# Patient Record
Sex: FEMALE | Race: WHITE
[De-identification: names, ages, dates, MRNs, and addresses within clinical notes are randomized per-mention and may not be internally consistent; named-entity substitution may affect disease eponyms.]

---

## 2020-07-31 ENCOUNTER — HOSPITAL ENCOUNTER (EMERGENCY)
Dept: HOSPITAL 62 - ER | Age: 55
LOS: 1 days | Discharge: ANOTHER HOSPITAL | End: 2020-08-01
Payer: SELF-PAY

## 2020-07-31 DIAGNOSIS — R53.1: ICD-10-CM

## 2020-07-31 DIAGNOSIS — A41.9: ICD-10-CM

## 2020-07-31 DIAGNOSIS — R11.0: ICD-10-CM

## 2020-07-31 DIAGNOSIS — N13.9: ICD-10-CM

## 2020-07-31 DIAGNOSIS — R50.9: Primary | ICD-10-CM

## 2020-07-31 DIAGNOSIS — N20.0: ICD-10-CM

## 2020-07-31 LAB
ABSOLUTE LYMPHOCYTES# (MANUAL): 0 10^3/UL (ref 0.5–4.7)
ABSOLUTE MONOCYTES # (MANUAL): 0.2 10^3/UL (ref 0.1–1.4)
ADD MANUAL DIFF: YES
ALBUMIN SERPL-MCNC: 3.6 G/DL (ref 3.5–5)
ALP SERPL-CCNC: 131 U/L (ref 38–126)
ANION GAP SERPL CALC-SCNC: 6 MMOL/L (ref 5–19)
APPEARANCE UR: (no result)
APTT PPP: YELLOW S
AST SERPL-CCNC: 32 U/L (ref 14–36)
BASE EXCESS BLDV CALC-SCNC: -2.1 MMOL/L
BASOPHILS NFR BLD MANUAL: 0 % (ref 0–2)
BILIRUB DIRECT SERPL-MCNC: 0 MG/DL (ref 0–0.4)
BILIRUB SERPL-MCNC: 0.4 MG/DL (ref 0.2–1.3)
BILIRUB UR QL STRIP: NEGATIVE
BUN SERPL-MCNC: 22 MG/DL (ref 7–20)
CALCIUM: 9.1 MG/DL (ref 8.4–10.2)
CHLORIDE SERPL-SCNC: 106 MMOL/L (ref 98–107)
CO2 SERPL-SCNC: 25 MMOL/L (ref 22–30)
EOSINOPHIL NFR BLD MANUAL: 0 % (ref 0–6)
ERYTHROCYTE [DISTWIDTH] IN BLOOD BY AUTOMATED COUNT: 12.7 % (ref 11.5–14)
GLUCOSE SERPL-MCNC: 130 MG/DL (ref 75–110)
GLUCOSE UR STRIP-MCNC: NEGATIVE MG/DL
HCO3 BLDV-SCNC: 22.3 MMOL/L (ref 20–32)
HCT VFR BLD CALC: 39.9 % (ref 36–47)
HGB BLD-MCNC: 13.2 G/DL (ref 12–15.5)
INR PPP: 0.98
KETONES UR STRIP-MCNC: NEGATIVE MG/DL
MCH RBC QN AUTO: 30.6 PG (ref 27–33.4)
MCHC RBC AUTO-ENTMCNC: 33 G/DL (ref 32–36)
MCV RBC AUTO: 93 FL (ref 80–97)
MONOCYTES % (MANUAL): 1 % (ref 3–13)
PCO2 BLDV: 37.3 MMHG (ref 35–63)
PH BLDV: 7.4 [PH] (ref 7.3–7.42)
PH UR STRIP: 5 [PH] (ref 5–9)
PLATELET # BLD: 156 10^3/UL (ref 150–450)
PLATELET COMMENT: ADEQUATE
POTASSIUM SERPL-SCNC: 3.6 MMOL/L (ref 3.6–5)
PROT SERPL-MCNC: 6.5 G/DL (ref 6.3–8.2)
PROT UR STRIP-MCNC: NEGATIVE MG/DL
PROTHROMBIN TIME: 13.1 SEC (ref 11.4–15.4)
RBC # BLD AUTO: 4.3 10^6/UL (ref 3.72–5.28)
RBC MORPH BLD: (no result)
SEGMENTED NEUTROPHILS % (MAN): 99 % (ref 42–78)
SP GR UR STRIP: 1.02
TOTAL CELLS COUNTED BLD: 100
UROBILINOGEN UR-MCNC: NEGATIVE MG/DL (ref ?–2)
VARIANT LYMPHS NFR BLD MANUAL: 0 % (ref 13–45)
WBC # BLD AUTO: 18.3 10^3/UL (ref 4–10.5)

## 2020-07-31 PROCEDURE — 96365 THER/PROPH/DIAG IV INF INIT: CPT

## 2020-07-31 PROCEDURE — 93005 ELECTROCARDIOGRAM TRACING: CPT

## 2020-07-31 PROCEDURE — 85025 COMPLETE CBC W/AUTO DIFF WBC: CPT

## 2020-07-31 PROCEDURE — 80053 COMPREHEN METABOLIC PANEL: CPT

## 2020-07-31 PROCEDURE — 71045 X-RAY EXAM CHEST 1 VIEW: CPT

## 2020-07-31 PROCEDURE — 87040 BLOOD CULTURE FOR BACTERIA: CPT

## 2020-07-31 PROCEDURE — 85610 PROTHROMBIN TIME: CPT

## 2020-07-31 PROCEDURE — 87088 URINE BACTERIA CULTURE: CPT

## 2020-07-31 PROCEDURE — 82803 BLOOD GASES ANY COMBINATION: CPT

## 2020-07-31 PROCEDURE — 83605 ASSAY OF LACTIC ACID: CPT

## 2020-07-31 PROCEDURE — 36415 COLL VENOUS BLD VENIPUNCTURE: CPT

## 2020-07-31 PROCEDURE — 74176 CT ABD & PELVIS W/O CONTRAST: CPT

## 2020-07-31 PROCEDURE — 81001 URINALYSIS AUTO W/SCOPE: CPT

## 2020-07-31 PROCEDURE — 87086 URINE CULTURE/COLONY COUNT: CPT

## 2020-07-31 PROCEDURE — 93010 ELECTROCARDIOGRAM REPORT: CPT

## 2020-07-31 PROCEDURE — 99291 CRITICAL CARE FIRST HOUR: CPT

## 2020-07-31 PROCEDURE — 87186 SC STD MICRODIL/AGAR DIL: CPT

## 2020-07-31 PROCEDURE — 96361 HYDRATE IV INFUSION ADD-ON: CPT

## 2020-07-31 RX ADMIN — SODIUM CHLORIDE, SODIUM LACTATE, POTASSIUM CHLORIDE, AND CALCIUM CHLORIDE PRN MLS/HR: .6; .31; .03; .02 INJECTION, SOLUTION INTRAVENOUS at 22:13

## 2020-07-31 RX ADMIN — SODIUM CHLORIDE, SODIUM LACTATE, POTASSIUM CHLORIDE, AND CALCIUM CHLORIDE PRN MLS/HR: .6; .31; .03; .02 INJECTION, SOLUTION INTRAVENOUS at 21:10

## 2020-07-31 NOTE — EKG REPORT
SEVERITY:- OTHERWISE NORMAL ECG -

SINUS RHYTHM

BORDERLINE LEFT AXIS DEVIATION

:

Confirmed by: Samantha Malave MD 31-Jul-2020 23:02:50

## 2020-07-31 NOTE — RADIOLOGY REPORT (SQ)
EXAM DESCRIPTION: 



XR CHEST 1 VIEW



COMPLETED DATE/TME:  07/31/2020 20:36



CLINICAL HISTORY: 



55 years, Female, fever source?



COMPARISON:

None.



NUMBER OF VIEWS:

One



TECHNIQUE:

Single frontal view of the chest was obtained portably



LIMITATIONS:

None.



FINDINGS:



Cardiac and mediastinal contours are normal. Lungs are clear. No

pleural effusion or pneumothorax.



IMPRESSION:



No acute disease.

 



copyright 2011 [employer]- All Rights Reserved

## 2020-07-31 NOTE — RADIOLOGY REPORT (SQ)
EXAM DESCRIPTION: 



CT ABDOMEN PELVIS WITHOUT IV CONTRAST



COMPLETED DATE/TME:  07/31/2020 20:37



CLINICAL HISTORY: 



55 years, Female, flank pn fever



COMPARISON:

None.



TECHNIQUE:

Noncontrast CT of the abdomen/pelvis was acquired. Coronal and

sagittal reformations were created.  Images stored on PACS.

 

All CT scanners at this facility use dose modulation, iterative

reconstruction, and/or weight based dosing when appropriate to

reduce radiation dose to as low as reasonably achievable (ALARA).





[facility]: Dose Right [facility]: CareDose   [facility]: Dose Right    [facility]:

Teradose 4D    [facility]: Smart Technologies



LIMITATIONS:

None.



FINDINGS:



Limited evaluation of the lower chest reveals clear lung bases.



Liver, spleen, pancreas, and both adrenal glands appear normal.

The gallbladder is absent.



A moderately sized nephrolith is noted about the interpolar

region of the right kidney, specifically measuring 1 cm in

diameter. Additional large nephrolith is noted about the lower

pole of the left kidney measuring 1.4 cm in diameter. Additional

calculi are noted about the left renal pelvis and ureteropelvic

junction, resulting in moderate left hydronephrosis. The

obstructive calculus is located about the UPJ measuring 0.9 cm in

size on image 43 of series 3. The urinary bladder is collapsed,

thus its evaluation is limited.



Uterus and both ovaries show no suspicious finding.



Multiple ventral hernias are noted about the anterior abdomen,

the largest of which contains both fat as well as a large amount

of small and large bowel. No evidence of bowel obstruction.

Additional ventral hernias located about the midline appear to

only contain fat.



Vascular structures are normal in their noncontrast

configuration. No suspicious lymphadenopathy or drainable fluid

collections are appreciated.



Bone windows show no destructive osseous lesions. Severe

multilevel facet arthropathy is noted about the lower lumbar

spine with grade 2 anterolisthesis of L5 upon S1.





IMPRESSION:



Obstructive 0.9 cm calculus located about the left UPJ with

associated moderate left hydronephrosis. Additional bilateral

nephrolithiasis, as above.



Large ventral hernia about the anterior mid abdomen containing a

large segment of small large bowel. No evidence of bowel

obstruction. Additional, albeit smaller fat-containing ventral

hernias.

 

TECHNICAL DOCUMENTATION:



Quality ID # [id number]: Final reports with documentation of one or more

dose reduction techniques (e.g., Automated exposure control,

adjustment of the mA and/or kV according to patient size, use of

iterative reconstruction technique)



copyright 2011 [employer]- All Rights Reserved

## 2020-07-31 NOTE — ER DOCUMENT REPORT
ED General





- General


Chief Complaint: Fever


Stated Complaint: WEAKNESS/FEVER


Time Seen by Provider: 07/31/20 20:33


Notes: 





55-year-old lady with a history of septic kidney stones presents with fever and 

weakness for 1 day with nausea increasing throughout the day today associate 

with decreased oral intake and prehospital fever found by EMS.  She is from 

[place] is no a primary care doctor.  No shortness of breath or COVID 

exposure.  No cough.  No abdominal pain.  Prehospital had an elevated lactic and

a fever.  Got some fluid prior to EMS arrival.





- Related Data


Allergies/Adverse Reactions: 


                                        





broccoli Adverse Reaction (Verified 07/31/20 20:54)


   


calcium Adverse Reaction (Verified 07/31/20 20:54)


   


mushroom Adverse Reaction (Verified 07/31/20 20:54)


   











Past Medical History





- General


Information source: Patient





- Social History


Smoking Status: Unknown if Ever Smoked


Family History: None





Review of Systems





- Review of Systems


Notes: 





REVIEW OF SYSTEMS





GEN: D fever and weakness and chills


ENT: Denies sore throat, nasal discharge, ear pain


EYES: Denies blurry vision, eye pain, discharge


CV: Denies chest pain, palpitations, edema


RESP: Denies cough, shortness of breath, wheezing


GI: Nausea and p.o. intolerance no diarrhea ea


MSK: Chronic back pain nothing different today


SKIN: Denies rash, skin lesions


LYMPH: Denies swollen glands/lymph nodes


NEURO: Denies headache, focal weakness or numbness, dizziness


PSYCH: Denies depression, suicidal or homicidal ideation








PHYSICAL EXAMINATION





General: No acute distress, well-nourished


Head: Atraumatic, normocephalic


ENT: Mouth normal, oropharynx moist, no exudates or tonsillar enlargement


Eyes: Conjunctiva normal, pupils equal, lids normal


Neck: No JVD, supple, no guarding


CVS: Normal rate, regular rhythm, no murmurs


Resp: No resp distress, equal and normal breath sounds bilaterally


GI: Nondistended, soft, no tenderness to palpation, no rebound or guarding


Ext: No deformities, no edema, normal range of motion in upper and lower ext


Back: No CVA or midline TTP


Skin: No rash, warm


Lymphatic: No lymphadeopathy noted


Neuro: Awake, alert.  Face symmetric.  GCS 15.





Physical Exam





- Vital signs


Vitals: 


                                        











Temp


 


 100.8 F H


 


 07/31/20 20:29














Course





- Re-evaluation


Re-evalutation: 





07/31/20 22:11


Patient presents with sepsis history of septic kidney stone, she had fever and 

fatigue since this morning which have worsened the point where now she is 

nauseous.  She has no back pain.  She has no diarrhea.  She has a large 

abdominal hernia which is been present for quite a while.  No skin changes 

injection drug use abscess or cellulitis etc.


07/31/20 22:14


Sepsis protocol initiated IV fluid ordered.  Ordered Rocephin as well.


Patient responded to fluids with decreased heart rate and feeling slightly 

better.  White count is grossly elevated.  Urinalysis pending, quality 

insufficient however on CT scan at about 10:10 PM I noted that she has a 

obstructing left UPJ stone which is almost a centimeter, causing hydronephrosis 

and is likely the cause of her sepsis


[facility] does not have urology, and I have made a call to [facility] at 10:10 PM for transfer.


08/01/20 00:13


Urine is infected.  [place] back from urology Dr. Solis advised.  He asked for 

the hospitalist admit the patient.  I expressed the urgency of the issue and 

then I am resuscitating with IV fluids.  The patient was then accepted by 

-in the emergency department evaluate for  transfer.  There is no 

transport available and I am told that she may have to wait until the morning.  

Her antibiotics are dosed Q24 and will continue fluids.





- Vital Signs


Vital signs: 


                                        











Temp Pulse Resp BP Pulse Ox


 


 99.4 F      18   106/60   98 


 


 07/31/20 22:10     07/31/20 23:59  07/31/20 23:59  07/31/20 23:59














- Laboratory


Result Diagrams: 


                                 07/31/20 21:03





                                 07/31/20 21:03


Laboratory results interpreted by me: 


                                        











  07/31/20 07/31/20 07/31/20





  21:03 21:03 21:03


 


WBC  18.3 H  


 


Seg Neuts % (Manual)  99 H  


 


Lymphocytes % (Manual)  0 L  


 


Monocytes % (Manual)  1 L  


 


Abs Neuts (Manual)  18.1 H  


 


Abs Lymphs (Manual)  0.0 L  


 


BUN   22 H 


 


Glucose   130 H 


 


Lactic Acid    2.7 H


 


ALT   54 H 


 


Alkaline Phosphatase   131 H 


 


Urine Blood   


 


Urine Nitrite (Reflex)   


 


Leukocyte Esterase Rfl   


 


Urine Ascorbic Acid   














  07/31/20





  22:03


 


WBC 


 


Seg Neuts % (Manual) 


 


Lymphocytes % (Manual) 


 


Monocytes % (Manual) 


 


Abs Neuts (Manual) 


 


Abs Lymphs (Manual) 


 


BUN 


 


Glucose 


 


Lactic Acid 


 


ALT 


 


Alkaline Phosphatase 


 


Urine Blood  MODERATE H


 


Urine Nitrite (Reflex)  POSITIVE H


 


Leukocyte Esterase Rfl  LARGE H


 


Urine Ascorbic Acid  20 H














- Diagnostic Test


Radiology reviewed: Image reviewed, Reports reviewed





Critical Care Note





- Critical Care Note


Total time excluding time spent on procedures (mins): 45


Comments: 





The above patient is critically ill.  Not including procedures, but including 

direct re-evaluations, speaking with patient and/or consultants, interpreting 

results, and documenting, I spent the total amount of minute listed listed above

on critical care time





Discharge





- Discharge


Clinical Impression: 


 Urinary tract obstruction by kidney stone





Sepsis


Qualifiers:


 Sepsis type: sepsis due to unspecified organism Sepsis acute organ dysfunction 

status: unspecified Qualified Code(s): A41.9 - Sepsis, unspecified organism





Condition: Critical


Disposition: [facility]

## 2020-08-01 VITALS — DIASTOLIC BLOOD PRESSURE: 70 MMHG | SYSTOLIC BLOOD PRESSURE: 117 MMHG

## 2020-08-01 NOTE — ER DOCUMENT REPORT
Doctor's Note


Notes: 





08/01/20 11:45


Transport is here for the patient to go to [facility].  Vital signs are stable, she 

is reasonably comfortable.  She is stable for transfer for emergency urological 

procedure.